# Patient Record
(demographics unavailable — no encounter records)

---

## 2025-07-21 NOTE — PHYSICAL EXAM
[Excoriation] : no perianal excoriation [Normal] : was normal [None] : there was no rectal mass  [de-identified] : Small element of right posterior prolapsing internal hemorrhoid with external component as well. [FreeTextEntry1] : Medical assistant was present for the entire exam.  Anoscopy was performed for evaluation of the patients rectal bleeding  history . The risks, benefits and alternatives were reviewed.  A lighted anoscope was passed into the anal canal and the entire anal mucosal surface was inspected..   The findings revealed moderate internal hemorrhoids. No masses or lesions were identified.   The risks and benefits of rubber band ligation were discussed with the patient including but not limited to bleeding, pain, infection, and the need for future procedures. The anoscope was placed and rubber band ligation was performed of the internal hemorrhoids-right posterior quadrant with good result. The patient tolerated the procedure well. Appropriate postprocedure instructions were given to the patient.

## 2025-07-21 NOTE — HISTORY OF PRESENT ILLNESS
[FreeTextEntry1] : 34 y/o M presents for initial evaluation  Reason for visit: Hemorrhoids Referred by: Dr. Sawyer Dockery  PMH: Anxiety PSH: Tonsillectomy FH: Prostate Ca (Father) Meds: Sertraline Allergies: Lorabid C-scope: 08/05/2024 Dr. Dockery  Patient presents today with c/o bleeding hemorrhoidsX5 years Reports intermittent bleeding cycles, may see it daily for 1 week straight and then it resolves.  Last time he saw blood was 2 weeks ago Denies straining or harder BMs. Denies extended time on commode.  Has used Preparation H in the past, last used a few months back. Has never been Rxed anything.

## 2025-07-21 NOTE — ASSESSMENT
[FreeTextEntry1] : I had a detailed discussion with the patient regarding optimization of bowel movements with increasing daily fiber and water intake. Both synthetic and dietary fiber recommendations were reviewed. I had strongly counseled the patient regarding the need for increasing water to help improve  bowel function.  I discussed with the patient that improving  bowel function will alleviate  hemorrhoidal symptoms.  Advised return in 4-6 weeks if symptoms are persistent.